# Patient Record
Sex: MALE | Race: ASIAN | Employment: FULL TIME | URBAN - METROPOLITAN AREA
[De-identification: names, ages, dates, MRNs, and addresses within clinical notes are randomized per-mention and may not be internally consistent; named-entity substitution may affect disease eponyms.]

---

## 2018-12-17 ENCOUNTER — TRANSFERRED RECORDS (OUTPATIENT)
Dept: HEALTH INFORMATION MANAGEMENT | Facility: CLINIC | Age: 43
End: 2018-12-17

## 2019-01-03 DIAGNOSIS — H35.711 CENTRAL SEROUS CHORIORETINOPATHY OF RIGHT EYE: Primary | ICD-10-CM

## 2019-01-07 ENCOUNTER — TRANSFERRED RECORDS (OUTPATIENT)
Dept: HEALTH INFORMATION MANAGEMENT | Facility: CLINIC | Age: 44
End: 2019-01-07

## 2019-01-09 ENCOUNTER — OFFICE VISIT (OUTPATIENT)
Dept: OPHTHALMOLOGY | Facility: CLINIC | Age: 44
End: 2019-01-09
Attending: OPHTHALMOLOGY
Payer: COMMERCIAL

## 2019-01-09 DIAGNOSIS — H35.711 CENTRAL SEROUS CHORIORETINOPATHY OF RIGHT EYE: ICD-10-CM

## 2019-01-09 PROCEDURE — G0463 HOSPITAL OUTPT CLINIC VISIT: HCPCS | Mod: ZF

## 2019-01-09 PROCEDURE — 92250 FUNDUS PHOTOGRAPHY W/I&R: CPT | Mod: ZF | Performed by: OPHTHALMOLOGY

## 2019-01-09 PROCEDURE — 92134 CPTRZ OPH DX IMG PST SGM RTA: CPT | Mod: ZF | Performed by: OPHTHALMOLOGY

## 2019-01-09 RX ORDER — ATENOLOL 25 MG/1
25 TABLET ORAL DAILY
COMMUNITY

## 2019-01-09 RX ORDER — LOSARTAN POTASSIUM 25 MG/1
25 TABLET ORAL DAILY
COMMUNITY

## 2019-01-09 RX ORDER — ATORVASTATIN CALCIUM 20 MG/1
20 TABLET, FILM COATED ORAL DAILY
COMMUNITY

## 2019-01-09 RX ORDER — EPLERENONE 25 MG/1
25 TABLET, FILM COATED ORAL 2 TIMES DAILY
COMMUNITY
Start: 2018-12-17 | End: 2019-04-09

## 2019-01-09 ASSESSMENT — EXTERNAL EXAM - RIGHT EYE: OD_EXAM: NORMAL

## 2019-01-09 ASSESSMENT — VISUAL ACUITY
CORRECTION_TYPE: GLASSES
METHOD: SNELLEN - LINEAR
OS_CC: 20/20
OD_CC: 20/40
OD_CC+: +2

## 2019-01-09 ASSESSMENT — CUP TO DISC RATIO
OD_RATIO: 0.6
OS_RATIO: 0.5

## 2019-01-09 ASSESSMENT — TONOMETRY
IOP_METHOD: TONOPEN
OS_IOP_MMHG: 16
OD_IOP_MMHG: 16

## 2019-01-09 ASSESSMENT — CONF VISUAL FIELD
OS_NORMAL: 1
OD_NORMAL: 1

## 2019-01-09 ASSESSMENT — REFRACTION_WEARINGRX
OD_AXIS: 106
OD_SPHERE: -0.50
OS_CYLINDER: +1.25
OS_SPHERE: -0.50
OD_CYLINDER: +1.25
OS_AXIS: 090

## 2019-01-09 ASSESSMENT — EXTERNAL EXAM - LEFT EYE: OS_EXAM: NORMAL

## 2019-01-09 ASSESSMENT — SLIT LAMP EXAM - LIDS
COMMENTS: NORMAL
COMMENTS: NORMAL

## 2019-01-09 NOTE — PROGRESS NOTES
CC: Jean referral;  OD    HPI: Jhony Franco is a  43 year old year-old patient with history of presumed  OD diagnosed 11/30/18 by Dr. Pena. Started on Eplerenone 25 mg daily at that time, increased to BID mid 12/2018. No steroid use, no inhaled asthma meds.    PMH: DM (dx 2015; A1c 6.8 2018, on Metformin), HTN (med controlled)  Social: Interventional cardiologist; poor sleep cycle. Does not feel stressed.    Retinal Imaging:  OCT Macula 1/9/19  RE: Shallow SRF; no IRF; small parafoveal PED inferonasally ( --> 371 --> 218 --> 200 today) (using outside OCT images from Dr. Pena)  LE: Normal foveal contour; no RPE irregularity; thick choroid    Assessment & Plan:  1.  OD   - Dx 11/30/18; started on Eplerenone 25 mg. Worsening SRF 12/17/18; increased to BID; improvement 1/7/19. (20/25 --> 20/50 --> 20/40 today)   - Much improvement on Eplerenone; urged K+ checks, Continue 25 mg BID   - Continue f/u with Dr. Pena; return if worsening   - possible previous episode left eye    - plan for FA/ICG if worsening    2. Type II DM without diabetic retinopathy   - Stressed importance of good blood sugar control.    - Continue yearly f/u with Dr. Pena    3. NS OU   - Mild, observe      Lino Campos M.D.  PGY-3, Ophthalmology       Complete documentation of historical and exam elements from today's encounter can be found in the full encounter summary report (not reduplicated in this progress note).  I personally obtained the chief complaint(s) and history of present illness.  I confirmed and edited as necessary the review of systems, past medical/surgical history, family history, social history, and examination findings as documented by others; and I examined the patient myself.  I personally reviewed the relevant tests, images, and reports as documented above.  I personally reviewed the ophthalmic test(s) associated with this encounter, agree with the interpretation(s) as documented by the  resident/fellow, and have edited the corresponding report(s) as necessary.   I formulated and edited as necessary the assessment and plan and discussed the findings and management plan with the patient and family       Monty Carpenetr MD PhD  Vitreoretinal Surgery Fellow  [facility]

## 2019-01-09 NOTE — NURSING NOTE
Chief Complaints and History of Present Illnesses   Patient presents with     New Patient     Chief Complaint(s) and History of Present Illness(es)     New Patient     Laterality: right eye    Onset: 5 weeks ago    Location: central vision    Pain scale: 0/10              Comments     New patient is here for retinal exam and vision loss in the right eye.   JAMES Mckenna 10:41 AM 01/09/2019

## 2019-03-25 DIAGNOSIS — H35.711 CENTRAL SEROUS CHORIORETINOPATHY OF RIGHT EYE: Primary | ICD-10-CM

## 2019-04-09 ENCOUNTER — OFFICE VISIT (OUTPATIENT)
Dept: OPHTHALMOLOGY | Facility: CLINIC | Age: 44
End: 2019-04-09
Attending: OPHTHALMOLOGY
Payer: COMMERCIAL

## 2019-04-09 VITALS — HEIGHT: 67 IN | WEIGHT: 161 LBS | BODY MASS INDEX: 25.27 KG/M2

## 2019-04-09 DIAGNOSIS — H35.711 CENTRAL SEROUS CHORIORETINOPATHY OF RIGHT EYE: Primary | ICD-10-CM

## 2019-04-09 DIAGNOSIS — H35.711 CENTRAL SEROUS CHORIORETINOPATHY OF RIGHT EYE: ICD-10-CM

## 2019-04-09 PROCEDURE — 25000128 H RX IP 250 OP 636: Mod: ZF | Performed by: OPHTHALMOLOGY

## 2019-04-09 PROCEDURE — 92242 FLUORESCEIN&ICG ANGIOGRAPHY: CPT

## 2019-04-09 PROCEDURE — 92015 DETERMINE REFRACTIVE STATE: CPT | Mod: ZF

## 2019-04-09 PROCEDURE — G0463 HOSPITAL OUTPT CLINIC VISIT: HCPCS | Mod: ZF,25

## 2019-04-09 PROCEDURE — 92235 FLUORESCEIN ANGRPH MLTIFRAME: CPT | Mod: ZF | Performed by: OPHTHALMOLOGY

## 2019-04-09 PROCEDURE — 92240 ICG ANGIOGRAPHY I&R UNI/BI: CPT | Mod: ZF | Performed by: OPHTHALMOLOGY

## 2019-04-09 PROCEDURE — 92134 CPTRZ OPH DX IMG PST SGM RTA: CPT | Mod: ZF | Performed by: OPHTHALMOLOGY

## 2019-04-09 PROCEDURE — 67221 OCULAR PHOTODYNAMIC THER: CPT | Mod: RT,ZF | Performed by: OPHTHALMOLOGY

## 2019-04-09 RX ADMIN — VERTEPORFIN FOR INJECTION 11.4 MG: 15 INJECTION, POWDER, LYOPHILIZED, FOR SOLUTION INTRAVENOUS at 11:51

## 2019-04-09 ASSESSMENT — MIFFLIN-ST. JEOR: SCORE: 1593.92

## 2019-04-09 ASSESSMENT — CUP TO DISC RATIO
OD_RATIO: 0.6
OS_RATIO: 0.5

## 2019-04-09 ASSESSMENT — REFRACTION_WEARINGRX
OD_SPHERE: -0.50
OS_CYLINDER: +1.25
OD_AXIS: 106
OS_SPHERE: -0.50
OD_CYLINDER: +1.25
OS_AXIS: 090

## 2019-04-09 ASSESSMENT — VISUAL ACUITY
CORRECTION_TYPE: GLASSES
METHOD: SNELLEN - LINEAR
OD_CC+: +1
OS_CC: 20/20
OD_CC: 20/40

## 2019-04-09 ASSESSMENT — CONF VISUAL FIELD
METHOD: COUNTING FINGERS
OS_NORMAL: 1
OD_NORMAL: 1

## 2019-04-09 ASSESSMENT — EXTERNAL EXAM - LEFT EYE: OS_EXAM: NORMAL

## 2019-04-09 ASSESSMENT — REFRACTION_MANIFEST
OD_CYLINDER: +0.75
OS_SPHERE: -0.25
OD_SPHERE: +0.25
OS_CYLINDER: +1.25
OS_ADD: +1.50
OD_ADD: +1.50
OD_AXIS: 100
OS_AXIS: 090

## 2019-04-09 ASSESSMENT — SLIT LAMP EXAM - LIDS
COMMENTS: NORMAL
COMMENTS: NORMAL

## 2019-04-09 ASSESSMENT — TONOMETRY
IOP_METHOD: TONOPEN
OS_IOP_MMHG: 17
OD_IOP_MMHG: 16

## 2019-04-09 ASSESSMENT — EXTERNAL EXAM - RIGHT EYE: OD_EXAM: NORMAL

## 2019-04-09 NOTE — PROGRESS NOTES
CC -   right eye     INTERVAL HISTORY - Initial visit with me, ?improvenet since 1/2019 when saw Pauline LEES -   Jhony Franco is a  43 year old year-old patient referred by Dr Merlyn Pena for evaluation and treat of  right eye.   New symptoms 11/2018, seen 1/2019 by Pauline  Cardiologist interventional at [place]  No h/o steroid use    PAST OCULAR SURGERY  None    RETINAL IMAGING:  OCT  4-9-19  OD - SRF, thick choroid  OS - normal retina, thick choroid    FA 4-9-19  OD - leakage source by ST [facility], 3 mm spot size  OS - normal    ICG 4-9-19  OD - no polyps  OS - normal        ASSESSMENT & PLAN    1.  OD   - new onset 11/2018, first episode   - no steroid use   - no significant change on OCT today   - advise hf-PDT today     - r/b/a d/w patient   - f/u 1-2 months      2. Vitreous syneresis OU    3. Large CDR OU   - per patient longstanding    - d/w patient continue to monitor      4. Tr NS OU      return to clinic: 2 months OCT OU    ATTESTATION     Attending Attestation:     Complete documentation of historical and exam elements from today's encounter can be found in the full encounter summary report (not reduplicated in this progress note).  I personally obtained the chief complaint(s) and history of present illness.  I confirmed and edited as necessary the review of systems, past medical/surgical history, family history, social history, and examination findings as documented by others; and I examined the patient myself.  I personally reviewed the relevant tests, images, and reports as documented above.  I formulated and edited as necessary the assessment and plan and discussed the findings and management plan with the patient and family    Alix Churchill MD, PhD  , Vitreoretinal Surgery  Department of Ophthalmology  [facility]

## 2019-04-09 NOTE — LETTER
4/9/2019       RE: Jhony Franco  [address] [place] [id number]     Dear Colleague,    Thank you for referring your patient, Jhony Franco, to the EYE CLINIC at [facility]. Please see a copy of my visit note below.    CC -   right eye     INTERVAL HISTORY - Initial visit with me, ?improvenet since 1/2019 when saw Pauline LEES -   Jhony Franco is a  43 year old year-old patient referred by Dr Merlyn Pena for evaluation and treat of  right eye.   New symptoms 11/2018, seen 1/2019 by Pauline  Cardiologist interventional at [place]  No h/o steroid use    PAST OCULAR SURGERY  None    RETINAL IMAGING:  OCT  4-9-19  OD - SRF, thick choroid  OS - normal retina, thick choroid    FA 4-9-19  OD - leakage source by ST [place], 3 mm spot size  OS - normal    ICG 4-9-19  OD - no polyps  OS - normal    ASSESSMENT & PLAN  1.  OD   - new onset 11/2018, first episode   - no steroid use   - no significant change on OCT today   - advise hf-PDT today     - r/b/a d/w patient   - f/u 1-2 months    2. Vitreous syneresis OU    3. Large CDR OU   - per patient longstanding    - d/w patient continue to monitor    4. Tr NS OU    return to clinic: 2 months OCT OU    ATTESTATION   Attending Attestation: Complete documentation of historical and exam elements from today's encounter can be found in the full encounter summary report (not reduplicated in this progress note).  I personally obtained the chief complaint(s) and history of present illness.  I confirmed and edited as necessary the review of systems, past medical/surgical history, family history, social history, and examination findings as documented by others; and I examined the patient myself.  I personally reviewed the relevant tests, images, and reports as documented above.  I formulated and edited as necessary the assessment and plan and discussed the findings and management plan with the patient and family.  Alix Churchill MD, PhD      Base Eye  Exam     Visual Acuity (Snellen - Linear)       Right Left    Dist cc 20/40 +1 20/20    Dist ph cc NI     Correction:  Glasses          Tonometry (Tonopen, 9:37 AM)       Right Left    Pressure 16 17          Pupils       React APD    Right Brisk None    Left Brisk None          Visual Fields (Counting fingers)       Left Right     Full Full          Extraocular Movement       Right Left     Full Full          Neuro/Psych     Oriented x3:  Yes    Mood/Affect:  Normal          Dilation     Both eyes:  1.0% Mydriacyl, 2.5% Kamran Synephrine @ 9:37 AM            Slit Lamp and Fundus Exam     External Exam       Right Left    External Normal Normal          Slit Lamp Exam       Right Left    Lids/Lashes Normal Normal    Conjunctiva/Sclera White and quiet White and quiet    Cornea Clear Clear    Anterior Chamber Deep and quiet Deep and quiet    Iris Dilated Dilated    Lens Tr NS, Tr cortical Tr NS, Tr cortical    Vitreous syneresis syneresis          Fundus Exam       Right Left    Disc Normal, thin temporal NRR Normal    C/D Ratio 0.6 0.5    Macula supratemporal demaracation line nasal pigment changes    Vessels Normal Normal    Periphery Normal Normal            Refraction     Wearing Rx       Sphere Cylinder Axis    Right -0.50 +1.25 106    Left -0.50 +1.25 090          Manifest Refraction       Sphere Cylinder [place] Dist VA Add Near VA    Right +0.25 +0.75 100 20/30-2 +1.50 J2    Left -0.25 +1.25 090 20/20 +1.50 J1+          Final Rx       Sphere Cylinder [place] Dist VA Add Near VA    Right +0.25 +0.75 100 20/30-2 +1.50 J2    Left -0.25 +1.25 090 20/20 +1.50 J1+    Comments:  Signs are correct                Again, thank you for allowing me to participate in the care of your patient.      Sincerely,    Alix Churchill MD, PhD  , Vitreoretinal Surgery  Department of Ophthalmology & Visual Neurosciences  [facility]

## 2019-04-09 NOTE — NURSING NOTE
Chief Complaints and History of Present Illnesses   Patient presents with     Follow Up     Chief Complaint(s) and History of Present Illness(es)     Follow up for  right eye.    The patient states his vision is the same as last visit.  JAMES Mckenna 9:15 AM 04/09/2019